# Patient Record
Sex: MALE
[De-identification: names, ages, dates, MRNs, and addresses within clinical notes are randomized per-mention and may not be internally consistent; named-entity substitution may affect disease eponyms.]

---

## 2019-03-05 ENCOUNTER — APPOINTMENT (OUTPATIENT)
Dept: PULMONOLOGY | Facility: CLINIC | Age: 79
End: 2019-03-05
Payer: MEDICARE

## 2019-03-05 VITALS
WEIGHT: 244 LBS | BODY MASS INDEX: 36.98 KG/M2 | DIASTOLIC BLOOD PRESSURE: 94 MMHG | HEART RATE: 90 BPM | SYSTOLIC BLOOD PRESSURE: 148 MMHG | HEIGHT: 68 IN | OXYGEN SATURATION: 98 %

## 2019-03-05 DIAGNOSIS — Z78.9 OTHER SPECIFIED HEALTH STATUS: ICD-10-CM

## 2019-03-05 DIAGNOSIS — Z86.718 PERSONAL HISTORY OF OTHER VENOUS THROMBOSIS AND EMBOLISM: ICD-10-CM

## 2019-03-05 DIAGNOSIS — Z87.891 PERSONAL HISTORY OF NICOTINE DEPENDENCE: ICD-10-CM

## 2019-03-05 DIAGNOSIS — I26.99 OTHER PULMONARY EMBOLISM W/OUT ACUTE COR PULMONALE: ICD-10-CM

## 2019-03-05 DIAGNOSIS — J44.9 CHRONIC OBSTRUCTIVE PULMONARY DISEASE, UNSPECIFIED: ICD-10-CM

## 2019-03-05 PROCEDURE — 94060 EVALUATION OF WHEEZING: CPT

## 2019-03-05 PROCEDURE — 94729 DIFFUSING CAPACITY: CPT

## 2019-03-05 PROCEDURE — 94727 GAS DIL/WSHOT DETER LNG VOL: CPT

## 2019-03-05 PROCEDURE — 99203 OFFICE O/P NEW LOW 30 MIN: CPT | Mod: 25

## 2019-03-06 PROBLEM — Z78.9 SOCIAL ALCOHOL USE: Status: ACTIVE | Noted: 2019-03-06

## 2019-03-06 PROBLEM — I26.99 PULMONARY EMBOLISM: Status: ACTIVE | Noted: 2019-03-05

## 2019-03-06 PROBLEM — Z86.718 HISTORY OF DEEP VENOUS THROMBOSIS: Status: RESOLVED | Noted: 2019-03-06 | Resolved: 2019-03-06

## 2019-03-06 PROBLEM — J44.9 OAD (OBSTRUCTIVE AIRWAY DISEASE): Status: ACTIVE | Noted: 2019-03-06

## 2019-03-06 PROBLEM — Z87.891 FORMER CIGARETTE SMOKER: Status: ACTIVE | Noted: 2019-03-06

## 2019-03-06 RX ORDER — RIVAROXABAN 20 MG/1
20 TABLET, FILM COATED ORAL
Refills: 0 | Status: ACTIVE | COMMUNITY
Start: 2019-03-06

## 2019-03-06 NOTE — HISTORY OF PRESENT ILLNESS
[FreeTextEntry1] : 79 yo male presents for pulmonary evaluation after recent DVT/PE. [place] [facility] [place] with left hip fracture. The patient was subsequently discharged to rehab on a DOAC.He was subsequently readmitted to the hospital for LOC,treated for DVT/PE.His hospital course was complicated with pneumonia and hematuria and renal insufficiency Presently he denies cough, chest pain, SOB or hemoptysis.His in patient chart was reviewed.

## 2019-03-06 NOTE — PHYSICAL EXAM
[General Appearance - Well Developed] : well developed [Normal Appearance] : normal appearance [Well Groomed] : well groomed [General Appearance - Well Nourished] : well nourished [No Deformities] : no deformities [General Appearance - In No Acute Distress] : no acute distress [Normal Conjunctiva] : the conjunctiva exhibited no abnormalities [Eyelids - No Xanthelasma] : the eyelids demonstrated no xanthelasmas [Normal Oropharynx] : normal oropharynx [Neck Appearance] : the appearance of the neck was normal [Neck Cervical Mass (___cm)] : no neck mass was observed [Jugular Venous Distention Increased] : there was no jugular-venous distention [Thyroid Diffuse Enlargement] : the thyroid was not enlarged [Thyroid Nodule] : there were no palpable thyroid nodules [Heart Rate And Rhythm] : heart rate and rhythm were normal [Heart Sounds] : normal S1 and S2 [Murmurs] : no murmurs present [Respiration, Rhythm And Depth] : normal respiratory rhythm and effort [Exaggerated Use Of Accessory Muscles For Inspiration] : no accessory muscle use [Auscultation Breath Sounds / Voice Sounds] : lungs were clear to auscultation bilaterally [Abdomen Soft] : soft [Abdomen Tenderness] : non-tender [Abdomen Mass (___ Cm)] : no abdominal mass palpated [Nail Clubbing] : no clubbing of the fingernails [Cyanosis, Localized] : no localized cyanosis [Petechial Hemorrhages (___cm)] : no petechial hemorrhages [FreeTextEntry1] : Post op knee scars [Skin Color & Pigmentation] : normal skin color and pigmentation [Skin Turgor] : normal skin turgor [] : no rash [No Focal Deficits] : no focal deficits [Oriented To Time, Place, And Person] : oriented to person, place, and time [Impaired Insight] : insight and judgment were intact [Affect] : the affect was normal

## 2019-03-06 NOTE — DISCUSSION/SUMMARY
[FreeTextEntry1] : 77 yo male with stable pulmonary status post DVT/PE. Given the bronchodilator response on PFT,albuterol MDI PRN was prescribed. The patient is to follow up with his cardiologist for 2D echo to assess              PA  and right heart pressures. Treatment with DOAC for 3 to 6 months as planned. Follow up D-dimer levels should be performed prior to discontinuing anticoagulation. He is to follow up with his PMD as before.\par His wife was present.

## 2019-03-21 ENCOUNTER — APPOINTMENT (OUTPATIENT)
Dept: PULMONOLOGY | Facility: CLINIC | Age: 79
End: 2019-03-21

## 2019-04-03 ENCOUNTER — RECORD ABSTRACTING (OUTPATIENT)
Age: 79
End: 2019-04-03

## 2019-04-03 DIAGNOSIS — M16.10 UNILATERAL PRIMARY OSTEOARTHRITIS, UNSPECIFIED HIP: ICD-10-CM

## 2019-04-03 DIAGNOSIS — M79.606 PAIN IN LEG, UNSPECIFIED: ICD-10-CM

## 2019-04-03 DIAGNOSIS — M51.26 OTHER INTERVERTEBRAL DISC DISPLACEMENT, LUMBAR REGION: ICD-10-CM

## 2019-04-03 DIAGNOSIS — M17.10 UNILATERAL PRIMARY OSTEOARTHRITIS, UNSPECIFIED KNEE: ICD-10-CM

## 2019-04-03 RX ORDER — AMLODIPINE BESYLATE 5 MG/1
5 TABLET ORAL
Refills: 0 | Status: ACTIVE | COMMUNITY

## 2019-04-03 RX ORDER — CLOTRIMAZOLE AND BETAMETHASONE DIPROPIONATE 10; .5 MG/G; MG/G
1-0.05 CREAM TOPICAL
Refills: 0 | Status: ACTIVE | COMMUNITY

## 2019-04-03 RX ORDER — MELOXICAM 7.5 MG/1
7.5 TABLET ORAL TWICE DAILY
Refills: 0 | Status: ACTIVE | COMMUNITY

## 2019-04-03 RX ORDER — AMLODIPINE BESYLATE 10 MG/1
10 TABLET ORAL
Refills: 0 | Status: ACTIVE | COMMUNITY

## 2019-04-03 RX ORDER — FINASTERIDE 5 MG/1
5 TABLET, FILM COATED ORAL
Refills: 0 | Status: ACTIVE | COMMUNITY

## 2019-04-03 RX ORDER — TAMSULOSIN HYDROCHLORIDE 0.4 MG/1
0.4 CAPSULE ORAL
Refills: 0 | Status: ACTIVE | COMMUNITY

## 2019-04-03 RX ORDER — QUINAPRIL HYDROCHLORIDE 40 MG/1
40 TABLET, FILM COATED ORAL
Refills: 0 | Status: ACTIVE | COMMUNITY

## 2019-04-03 RX ORDER — APIXABAN 5 MG/1
5 TABLET, FILM COATED ORAL
Refills: 0 | Status: ACTIVE | COMMUNITY

## 2019-04-03 RX ORDER — MOMETASONE FUROATE 1 MG/G
0.1 CREAM TOPICAL
Refills: 0 | Status: ACTIVE | COMMUNITY

## 2019-04-03 RX ORDER — BETAMETHASONE DIPROPIONATE 0.5 MG/G
0.05 CREAM, AUGMENTED TOPICAL
Refills: 0 | Status: ACTIVE | COMMUNITY

## 2019-04-03 RX ORDER — GABAPENTIN 400 MG/1
400 CAPSULE ORAL
Refills: 0 | Status: ACTIVE | COMMUNITY

## 2019-04-03 RX ORDER — MECLIZINE HYDROCHLORIDE 12.5 MG/1
12.5 TABLET ORAL
Refills: 0 | Status: ACTIVE | COMMUNITY

## 2019-04-03 RX ORDER — CLOBETASOL PROPIONATE 0.5 MG/G
0.05 OINTMENT TOPICAL
Refills: 0 | Status: ACTIVE | COMMUNITY

## 2019-04-03 RX ORDER — SIMVASTATIN 20 MG/1
20 TABLET, FILM COATED ORAL
Refills: 0 | Status: ACTIVE | COMMUNITY

## 2019-04-03 RX ORDER — DUTASTERIDE AND TAMSULOSIN HYDROCHLORIDE .5; .4 MG/1; MG/1
0.5-0.4 CAPSULE ORAL
Refills: 0 | Status: ACTIVE | COMMUNITY

## 2019-04-03 RX ORDER — GABAPENTIN 300 MG/1
300 CAPSULE ORAL
Refills: 0 | Status: ACTIVE | COMMUNITY

## 2019-04-03 RX ORDER — PREGABALIN 50 MG/1
50 CAPSULE ORAL
Refills: 0 | Status: ACTIVE | COMMUNITY

## 2019-04-03 RX ORDER — GABAPENTIN 100 MG/1
100 CAPSULE ORAL
Refills: 0 | Status: ACTIVE | COMMUNITY

## 2019-04-03 RX ORDER — SIMVASTATIN 40 MG/1
40 TABLET, FILM COATED ORAL
Refills: 0 | Status: ACTIVE | COMMUNITY

## 2019-04-16 ENCOUNTER — APPOINTMENT (OUTPATIENT)
Dept: ORTHOPEDIC SURGERY | Facility: CLINIC | Age: 79
End: 2019-04-16
Payer: MEDICARE

## 2019-04-16 PROCEDURE — 20611 DRAIN/INJ JOINT/BURSA W/US: CPT | Mod: LT

## 2019-04-16 PROCEDURE — 99203 OFFICE O/P NEW LOW 30 MIN: CPT | Mod: 25

## 2019-04-16 RX ORDER — DICLOFENAC SODIUM 10 MG/G
1 GEL TOPICAL DAILY
Qty: 1 | Refills: 3 | Status: ACTIVE | COMMUNITY
Start: 2019-04-16 | End: 1900-01-01

## 2019-04-16 NOTE — DISCUSSION/SUMMARY
[de-identified] : Patient was also given a pulse pharyngeal prescriptions he is to ice the knee follow up with me in 2 weeks time if not improved

## 2019-04-16 NOTE — PHYSICAL EXAM
[de-identified] : Physical exam left knee patient has tenderness in the area of the medial collateral ligament origin of the left knee. Recall he is status post total knee replacement approximately 14 years. The knee has full range of motion there is no effusion there is no instability and she is neurovascularly intact distally.

## 2019-04-16 NOTE — HISTORY OF PRESENT ILLNESS
[de-identified] : Patient is here with continued left knee medial pain. He was seen a few weeks ago after a fall which resulted in a left hip IM jose placed by another physician. In the intervening time he is developed left medial pain he was told to ice the knee on the last visit and to take Tylenol because he is on blood thinners. He has not improved.

## 2019-04-30 ENCOUNTER — APPOINTMENT (OUTPATIENT)
Dept: ORTHOPEDIC SURGERY | Facility: CLINIC | Age: 79
End: 2019-04-30

## 2022-11-10 ENCOUNTER — APPOINTMENT (OUTPATIENT)
Dept: ORTHOPEDIC SURGERY | Facility: CLINIC | Age: 82
End: 2022-11-10

## 2022-11-10 DIAGNOSIS — S80.01XA CONTUSION OF RIGHT KNEE, INITIAL ENCOUNTER: ICD-10-CM

## 2022-11-10 PROCEDURE — 99204 OFFICE O/P NEW MOD 45 MIN: CPT

## 2022-11-10 RX ORDER — ATORVASTATIN CALCIUM 40 MG/1
40 TABLET, FILM COATED ORAL
Qty: 30 | Refills: 0 | Status: ACTIVE | COMMUNITY
Start: 2022-09-30

## 2022-11-10 RX ORDER — COVID-19 MOLECULAR TEST ASSAY
KIT MISCELLANEOUS
Qty: 8 | Refills: 0 | Status: ACTIVE | COMMUNITY
Start: 2022-05-31

## 2022-11-10 RX ORDER — CELECOXIB 200 MG/1
200 CAPSULE ORAL
Qty: 30 | Refills: 3 | Status: ACTIVE | COMMUNITY
Start: 2022-11-10 | End: 1900-01-01

## 2022-11-10 NOTE — DISCUSSION/SUMMARY
[Medication Risks Reviewed] : Medication risks reviewed [de-identified] : Patient I discussed the low possibility of any actual damage to the knee implant.  Patient does not have any erythema or tenderness of joint line to suggest any potential loosening.  Range of motion is excellent there is minimal soft tissue swelling and no effusion.  Patient and I discussed our options to help reduce some of his symptoms and placed on Celebrex 20 mg p.o. twice daily for 5-day course.  We talked about the reasonable risk benefits of medication including potential side effects.  We reviewed his current medication profile there seems to be no contraindication to its limited intermittent use.  We also talked about sending the patient for radiographs to return in a week you do not see any sustained symptomatic improvement with the current medication regimen prescribed.\par \par Plan.  Patient has diagnosis of contusion right knee.  He will return in a week with radiographs of the knee if he does not see sustained symptomatic improvement with the current anti-inflammatory regimen.

## 2022-11-10 NOTE — REASON FOR VISIT
[Follow-Up Visit] : a follow-up visit for [Knee Pain] : knee pain [FreeTextEntry2] : Rt knee pain. Pt missed a step and injured the knee. had this knee replaced in 2014.

## 2022-11-10 NOTE — PHYSICAL EXAM
[de-identified] : Right knee there is some tenderness to compression of the lateral patellofemoral facet.  Range of motion 0 to 125 degrees knee stable to AP stress varus valgus stress no effusion extensor mechanism is intact. [de-identified] : Unable to have radiographs today due to lack of x-ray tech.

## 2022-11-10 NOTE — HISTORY OF PRESENT ILLNESS
[de-identified] : Note patient returns today not seen in over 3 years she is status post right knee replacement 12 years ago.  Patient states that he fell over his increased vacation this summer and ever since then he sustained an injury to his right knee with the fall is noticed anterolateral knee pain.  Is able to ambulate extended distances has some increasing discomfort with stair climbing getting out of a seated position he is here to have it evaluated injury occurred approximately 2 months ago.

## 2022-12-01 ENCOUNTER — APPOINTMENT (OUTPATIENT)
Dept: ORTHOPEDIC SURGERY | Facility: CLINIC | Age: 82
End: 2022-12-01

## 2022-12-01 DIAGNOSIS — S80.11XD CONTUSION OF RIGHT KNEE, SUBSEQUENT ENCOUNTER: ICD-10-CM

## 2022-12-01 DIAGNOSIS — S80.01XD CONTUSION OF RIGHT KNEE, SUBSEQUENT ENCOUNTER: ICD-10-CM

## 2022-12-01 PROCEDURE — 99213 OFFICE O/P EST LOW 20 MIN: CPT

## 2022-12-01 NOTE — REASON FOR VISIT
[Follow-Up Visit] : a follow-up visit for [Knee Pain] : knee pain [FreeTextEntry2] : F/u for xray review

## 2022-12-01 NOTE — DISCUSSION/SUMMARY
[de-identified] : Patient has no radiographic evidence of any mechanical malfunction, fracture or obvious injury to the right knee.  We talked about her options.  At this point I have advised the patient to continue with Celebrex to be taken intermittently as needed for pain and also ice the area will be sent for an MRI to help evaluate him for possible occult fracture.\par \par Plan.  Patient was sent for CT scan to help evaluate the right knee prosthesis and surrounding bony structures.

## 2022-12-01 NOTE — HISTORY OF PRESENT ILLNESS
[de-identified] : Patient returns today with continued complaints of right full anterolateral knee pain.  Recall he status post knee replacement had a recent fall about 5 months ago and continues to have pain and discomfort in this region.  Patient is not noticed any significant sustained symptomatic improvement with the recently prescribed medications, specifically Celebrex prescribed last visit.

## 2022-12-01 NOTE — PHYSICAL EXAM
[de-identified] : Right knee there is some tenderness to compression of the lateral patellofemoral facet.  Range of motion 0 to 125 degrees knee stable to AP stress varus valgus stress no effusion extensor mechanism is intact. [de-identified] : Outside recent radiographs were reviewed AP standing individual and sunrise views were obtained showing excellent component position of the rotating platform Sigma type prosthesis right-sided no evidence of loosening migration wear fracture or other abnormality.

## 2023-07-13 ENCOUNTER — APPOINTMENT (OUTPATIENT)
Dept: ORTHOPEDIC SURGERY | Facility: CLINIC | Age: 83
End: 2023-07-13
Payer: MEDICARE

## 2023-07-13 PROCEDURE — 99215 OFFICE O/P EST HI 40 MIN: CPT

## 2023-07-13 NOTE — HISTORY OF PRESENT ILLNESS
[de-identified] : Patient returns today he has a new complaint of an occasional sense of the right knee giving way.  He recalls no specific accident or injury that is ongoing for about 3 months.  Patient is status post 10 years right knee replacement surgery without any previous issues.

## 2023-07-13 NOTE — DISCUSSION/SUMMARY
[de-identified] : Patient I discussed at length I believe the source of his sense of instability is not any looseness or instability of the knee itself but rather attributable to his moderate right-sided quadriceps atrophy.  In the short-term I believe patient benefit from use of a Genutrain type knee support which she has.  He encouraged him to wear it when he is outside of the home patient was also given instruction on how to help improve the quadricep strength with home exercises when he returns from his trip to [facility] in about 2 months he will be reevaluated possible sent for physical therapy at that point.\par \par This consultation lasted 40 minutes

## 2023-07-13 NOTE — PHYSICAL EXAM
[de-identified] : Right knee appears benign on exam range of motion 0 to 125 degrees knee stable to AP stress varus valgus stress in both full extension and 90 degrees of flexion there is no warmth no soft tissue effusion noted.  Patient does have some modest quadriceps atrophy of the right lower extremity as compared to the left.

## 2023-07-13 NOTE — REASON FOR VISIT
[Follow-Up Visit] : a follow-up visit for [Knee Pain] : knee pain [FreeTextEntry2] : right knee pain.